# Patient Record
Sex: FEMALE | Race: WHITE | NOT HISPANIC OR LATINO | Employment: UNEMPLOYED | RURAL
[De-identification: names, ages, dates, MRNs, and addresses within clinical notes are randomized per-mention and may not be internally consistent; named-entity substitution may affect disease eponyms.]

---

## 2022-11-30 ENCOUNTER — OFFICE VISIT (OUTPATIENT)
Dept: OBSTETRICS AND GYNECOLOGY | Facility: CLINIC | Age: 32
End: 2022-11-30
Payer: OTHER GOVERNMENT

## 2022-11-30 VITALS
DIASTOLIC BLOOD PRESSURE: 66 MMHG | WEIGHT: 188 LBS | HEIGHT: 68 IN | HEART RATE: 76 BPM | BODY MASS INDEX: 28.49 KG/M2 | SYSTOLIC BLOOD PRESSURE: 97 MMHG | RESPIRATION RATE: 16 BRPM

## 2022-11-30 DIAGNOSIS — Z31.9 PATIENT DESIRES PREGNANCY: ICD-10-CM

## 2022-11-30 PROCEDURE — 99203 OFFICE O/P NEW LOW 30 MIN: CPT | Mod: S$PBB,,, | Performed by: STUDENT IN AN ORGANIZED HEALTH CARE EDUCATION/TRAINING PROGRAM

## 2022-11-30 PROCEDURE — 99203 OFFICE O/P NEW LOW 30 MIN: CPT | Mod: PBBFAC | Performed by: STUDENT IN AN ORGANIZED HEALTH CARE EDUCATION/TRAINING PROGRAM

## 2022-11-30 PROCEDURE — 99203 PR OFFICE/OUTPT VISIT, NEW, LEVL III, 30-44 MIN: ICD-10-PCS | Mod: S$PBB,,, | Performed by: STUDENT IN AN ORGANIZED HEALTH CARE EDUCATION/TRAINING PROGRAM

## 2022-11-30 RX ORDER — CLOMIPHENE CITRATE 50 MG/1
50 TABLET ORAL DAILY
Qty: 5 TABLET | Refills: 0 | Status: SHIPPED | OUTPATIENT
Start: 2022-11-30 | End: 2022-12-05

## 2022-11-30 NOTE — PROGRESS NOTES
Gynecology History and Physical    Assessment/Plan:   Problem List Items Addressed This Visit          Renal/    Patient desires pregnancy    Overview     Presents to discuss fertility  Has not been on contraception since getting  4 years ago  Began actively trying to conceive approx 2 years ago  Cycles 25-29 days  Has been tracking ovulation through test strips, apps  No chronic medical problems  Partner is 10 years older,   Planning to have SA done through   Will try one month of clomid 50mg  If unsuccessful, will refer to [facility]               CC:   Chief Complaint   Patient presents with    Discuss Fertility      Pt has been trying to get pregnant for 2 years and no luck would like to discuss options      HPI: Gregoria Zaldivar is a 32 y.o. female who presents with complaints of trying to conceive for 2 years.     Review of Systems: The following ROS was otherwise negative, except as noted in the HPI:  constitutional, HEENT, respiratory, cardiovascular, gastrointestinal, genitourinary, skin, musculoskeletal, neurological, psych    Gynecologic History:   No history of of STIs    Menstrual history: every 25-29 days.  Average is 27 day cycles.       Obstetrical History:  OB History          0    Para   0    Term   0       0    AB   0    Living   0         SAB   0    IAB   0    Ectopic   0    Multiple   0    Live Births   0                 Past Medical History:   History reviewed. No pertinent past medical history.    Medications:  Medication List with Changes/Refills   New Medications    CLOMIPHENE (CLOMID) 50 MG TABLET    Take 1 tablet (50 mg total) by mouth once daily. for 5 days   Current Medications    PRENAT.VITS,DEANN,MIN-IRON-FOLIC (PRENATAL VITAMIN) TAB    Prenatal Vitamins Oral Tablet QTY: 0 tablet Days: 0 Refills: 0  Written: 22 Patient Instructions:       Allergies:  Patient has no known allergies.    Surgical History:  History reviewed. No pertinent surgical  "history.    Family History:  Family History   Problem Relation Age of Onset    Lung cancer Paternal Grandfather     Diabetes Maternal Grandmother     Leukemia Maternal Grandfather     Hypertension Father        Social History:  Social History     Substance and Sexual Activity   Alcohol Use Yes    Comment: socially     Social History     Substance and Sexual Activity   Drug Use Never     Social History     Tobacco Use   Smoking Status Never   Smokeless Tobacco Never       Physical Exam:  BP 97/66 (BP Location: Left arm, Patient Position: Sitting)   Pulse 76   Resp 16   Ht 5' 8" (1.727 m)   Wt 85.3 kg (188 lb)   LMP 11/13/2022   BMI 28.59 kg/m²     General: Alert, well appearing, no acute distress  Head: Normocephalic, atraumatic  Lungs: Unlabored respirations  Extremities: No redness or tenderness  Skin: Well perfused, normal coloration and turgor, no lesions or rashes visualized  Neuro: Alert, oriented, normal speech, no focal deficits, moves extremities appropriately  Osteopathic: No TART changes    Labs:  No visits with results within 1 Day(s) from this visit.   Latest known visit with results is:   No results found for any previous visit.      Spouse is getting referral for semen analysis with the .  Discussed [facility] options.   Rx for Clomid sent to pharmacy.  "

## 2022-12-01 PROBLEM — Z31.9 PATIENT DESIRES PREGNANCY: Status: ACTIVE | Noted: 2022-12-01

## 2023-01-02 ENCOUNTER — OFFICE VISIT (OUTPATIENT)
Dept: FAMILY MEDICINE | Facility: CLINIC | Age: 33
End: 2023-01-02
Payer: OTHER GOVERNMENT

## 2023-01-02 VITALS
OXYGEN SATURATION: 99 % | SYSTOLIC BLOOD PRESSURE: 118 MMHG | BODY MASS INDEX: 28.49 KG/M2 | HEART RATE: 93 BPM | WEIGHT: 188 LBS | DIASTOLIC BLOOD PRESSURE: 78 MMHG | HEIGHT: 68 IN

## 2023-01-02 DIAGNOSIS — J10.1 INFLUENZA A: Primary | ICD-10-CM

## 2023-01-02 DIAGNOSIS — Z20.822 CONTACT WITH AND (SUSPECTED) EXPOSURE TO COVID-19: ICD-10-CM

## 2023-01-02 LAB
CTP QC/QA: YES
FLUAV AG NPH QL: POSITIVE
FLUBV AG NPH QL: NEGATIVE
SARS-COV-2 AG RESP QL IA.RAPID: NEGATIVE

## 2023-01-02 PROCEDURE — 99202 OFFICE O/P NEW SF 15 MIN: CPT | Mod: ,,, | Performed by: NURSE PRACTITIONER

## 2023-01-02 PROCEDURE — 99202 PR OFFICE/OUTPT VISIT, NEW, LEVL II, 15-29 MIN: ICD-10-PCS | Mod: ,,, | Performed by: NURSE PRACTITIONER

## 2023-01-02 PROCEDURE — 87428 POCT SARS-COV2 (COVID) WITH FLU ANTIGEN: ICD-10-PCS | Mod: QW,,, | Performed by: NURSE PRACTITIONER

## 2023-01-02 PROCEDURE — 87428 SARSCOV & INF VIR A&B AG IA: CPT | Mod: QW,,, | Performed by: NURSE PRACTITIONER

## 2023-01-02 RX ORDER — OSELTAMIVIR PHOSPHATE 75 MG/1
75 CAPSULE ORAL 2 TIMES DAILY
Qty: 10 CAPSULE | Refills: 0 | Status: SHIPPED | OUTPATIENT
Start: 2023-01-02 | End: 2023-01-07

## 2023-01-02 NOTE — LETTER
January 2, 2023    Gregoria Zaldivar  [address] [facility] [facility] [place] [address]             [facility]  Family Medicine  [address] [facility] [place] [address]  Phone: [phone]  Fax: [phone]   January 2, 2023     Patient: Gregoria Zaldivar   YOB: 1990   Date of Visit: 1/2/2023       To Whom it May Concern:    Gregoria Zaldivar was seen in my clinic on 1/2/2023. She may return to work on 01/05/2023 .    Please excuse her from any classes or work missed.    If you have any questions or concerns, please don't hesitate to call.    Sincerely,         KAUSHAL Powell

## 2023-01-02 NOTE — PROGRESS NOTES
[facility]    Chief Complaint      Chief Complaint   Patient presents with    Cough    Headache    Dizziness    Fatigue       History of Present Illness      Gregoria Zaldivar is a 32 y.o. female. She  has no past medical history on file., who presents today for URI type symptoms x1-2 days.    Past Medical History:  History reviewed. No pertinent past medical history.    Past Surgical History:   has no past surgical history on file.    Social History:  Social History     Tobacco Use    Smoking status: Never    Smokeless tobacco: Never   Substance Use Topics    Alcohol use: Yes     Comment: socially    Drug use: Never       I personally reviewed all past medical, surgical, and social.     Review of Systems   Constitutional:  Positive for fatigue. Negative for chills and fever.   HENT:  Negative for rhinorrhea, sneezing and sore throat.    Respiratory:  Positive for cough. Negative for shortness of breath.    Gastrointestinal:  Negative for diarrhea, nausea and vomiting.   Musculoskeletal:  Negative for myalgias.   Skin:  Negative for rash.   Neurological:  Positive for dizziness and headaches.      Medications:  Outpatient Encounter Medications as of 1/2/2023   Medication Sig Dispense Refill    oseltamivir (TAMIFLU) 75 MG capsule Take 1 capsule (75 mg total) by mouth 2 (two) times daily. for 5 days 10 capsule 0    prenat.vits,gloria,min-iron-folic (PRENATAL VITAMIN) Tab Prenatal Vitamins Oral Tablet QTY: 0 tablet Days: 0 Refills: 0  Written: 06/30/22 Patient Instructions:       No facility-administered encounter medications on file as of 1/2/2023.       Allergies:  Review of patient's allergies indicates:  No Known Allergies    Health Maintenance:    There is no immunization history on file for this patient.   Health Maintenance   Topic Date Due    Hepatitis C Screening  Never done    Lipid Panel  Never done    TETANUS VACCINE  Never done        Physical Exam      Vital Signs  Pulse: 93  SpO2: 99 %  BP:  "118/78  BP Location: Right arm  Patient Position: Sitting  Height and Weight  Height: 5' 8" (172.7 cm)  Weight: 85.3 kg (188 lb)  BSA (Calculated - sq m): 2.02 sq meters  BMI (Calculated): 28.6  Weight in (lb) to have BMI = 25: 164.1]    Physical Exam  Vitals and nursing note reviewed.   Constitutional:       General: She is not in acute distress.     Appearance: She is well-developed. She is ill-appearing.   HENT:      Head: Normocephalic.      Right Ear: Hearing normal.      Left Ear: Hearing normal.      Nose: Nose normal.   Eyes:      General: Lids are normal.      Conjunctiva/sclera: Conjunctivae normal.   Cardiovascular:      Rate and Rhythm: Normal rate.   Pulmonary:      Effort: Pulmonary effort is normal. No respiratory distress.   Musculoskeletal:         General: Normal range of motion.      Cervical back: Normal range of motion and neck supple.   Skin:     General: Skin is warm and dry.   Neurological:      Mental Status: She is alert and oriented to person, place, and time.      Gait: Gait is intact.   Psychiatric:         Behavior: Behavior is cooperative.        Laboratory:  CBC:      CMP:        Invalid input(s): CREATININ  LIPIDS:      TSH:      A1C:        Assessment/Plan     Gregoria Zaldivar is a 32 y.o.female with:     1. Contact with and (suspected) exposure to covid-19  - POCT SARS-COV2 (COVID) with Flu Antigen    2. Influenza A  - oseltamivir (TAMIFLU) 75 MG capsule; Take 1 capsule (75 mg total) by mouth 2 (two) times daily. for 5 days  Dispense: 10 capsule; Refill: 0       Total time spent face-to-face and non-face-to-face coordinating care for this encounter was: 15 minutes     Chronic conditions status updated as per HPI.  Other than changes above, cont current medications and maintain follow up with specialists.  Return to clinic prn if symptoms worsen or fail to improve.    Carol Israel, KAUSHAL  [facility]  "

## 2023-01-03 ENCOUNTER — PATIENT MESSAGE (OUTPATIENT)
Dept: OBSTETRICS AND GYNECOLOGY | Facility: CLINIC | Age: 33
End: 2023-01-03
Payer: OTHER GOVERNMENT

## 2023-01-04 RX ORDER — CLOMIPHENE CITRATE 50 MG/1
50 TABLET ORAL DAILY
Qty: 5 TABLET | Refills: 2 | Status: SHIPPED | OUTPATIENT
Start: 2023-01-04 | End: 2023-01-09

## 2023-04-18 ENCOUNTER — PATIENT MESSAGE (OUTPATIENT)
Dept: OBSTETRICS AND GYNECOLOGY | Facility: CLINIC | Age: 33
End: 2023-04-18
Payer: OTHER GOVERNMENT

## 2023-04-19 ENCOUNTER — TELEPHONE (OUTPATIENT)
Dept: OBSTETRICS AND GYNECOLOGY | Facility: CLINIC | Age: 33
End: 2023-04-19
Payer: OTHER GOVERNMENT

## 2023-04-21 ENCOUNTER — TELEPHONE (OUTPATIENT)
Dept: OBSTETRICS AND GYNECOLOGY | Facility: CLINIC | Age: 33
End: 2023-04-21
Payer: OTHER GOVERNMENT

## 2023-04-21 NOTE — TELEPHONE ENCOUNTER
4/21/2023 ---- Attempted to call Gregoria re:  semen analysis orders for  and instruction;  when call was answered and I asked to speak with Gregoria, the call was disconnected.

## 2023-05-04 ENCOUNTER — TELEPHONE (OUTPATIENT)
Dept: OBSTETRICS AND GYNECOLOGY | Facility: CLINIC | Age: 33
End: 2023-05-04
Payer: OTHER GOVERNMENT

## 2023-05-04 DIAGNOSIS — Z31.9 PATIENT DESIRES PREGNANCY: Primary | ICD-10-CM

## 2023-05-23 ENCOUNTER — PATIENT MESSAGE (OUTPATIENT)
Dept: FAMILY MEDICINE | Facility: CLINIC | Age: 33
End: 2023-05-23
Payer: OTHER GOVERNMENT

## 2023-06-26 ENCOUNTER — PATIENT MESSAGE (OUTPATIENT)
Dept: OBSTETRICS AND GYNECOLOGY | Facility: CLINIC | Age: 33
End: 2023-06-26
Payer: OTHER GOVERNMENT

## 2023-06-28 ENCOUNTER — TELEPHONE (OUTPATIENT)
Dept: OBSTETRICS AND GYNECOLOGY | Facility: CLINIC | Age: 33
End: 2023-06-28
Payer: OTHER GOVERNMENT

## 2023-08-29 ENCOUNTER — OFFICE VISIT (OUTPATIENT)
Dept: FAMILY MEDICINE | Facility: CLINIC | Age: 33
End: 2023-08-29
Payer: OTHER GOVERNMENT

## 2023-08-29 VITALS
RESPIRATION RATE: 18 BRPM | TEMPERATURE: 100 F | HEIGHT: 67 IN | SYSTOLIC BLOOD PRESSURE: 116 MMHG | BODY MASS INDEX: 30.08 KG/M2 | HEART RATE: 105 BPM | DIASTOLIC BLOOD PRESSURE: 76 MMHG | WEIGHT: 191.63 LBS | OXYGEN SATURATION: 97 %

## 2023-08-29 DIAGNOSIS — R05.9 COUGH, UNSPECIFIED TYPE: ICD-10-CM

## 2023-08-29 DIAGNOSIS — J02.9 SORE THROAT: Primary | ICD-10-CM

## 2023-08-29 DIAGNOSIS — J10.1 INFLUENZA B: ICD-10-CM

## 2023-08-29 LAB
CTP QC/QA: YES
CTP QC/QA: YES
FLUAV AG NPH QL: NEGATIVE
FLUBV AG NPH QL: POSITIVE
S PYO RRNA THROAT QL PROBE: NEGATIVE
SARS-COV-2 AG RESP QL IA.RAPID: POSITIVE

## 2023-08-29 PROCEDURE — 87428 SARSCOV & INF VIR A&B AG IA: CPT | Mod: QW,,, | Performed by: FAMILY MEDICINE

## 2023-08-29 PROCEDURE — 87880 STREP A ASSAY W/OPTIC: CPT | Mod: QW,,, | Performed by: FAMILY MEDICINE

## 2023-08-29 PROCEDURE — 99213 OFFICE O/P EST LOW 20 MIN: CPT | Mod: ,,, | Performed by: FAMILY MEDICINE

## 2023-08-29 PROCEDURE — 87428 POCT SARS-COV2 (COVID) WITH FLU ANTIGEN: ICD-10-PCS | Mod: QW,,, | Performed by: FAMILY MEDICINE

## 2023-08-29 PROCEDURE — 87880 POCT RAPID STREP A: ICD-10-PCS | Mod: QW,,, | Performed by: FAMILY MEDICINE

## 2023-08-29 PROCEDURE — 99213 PR OFFICE/OUTPT VISIT, EST, LEVL III, 20-29 MIN: ICD-10-PCS | Mod: ,,, | Performed by: FAMILY MEDICINE

## 2023-08-29 RX ORDER — NEOMYCIN SULFATE, POLYMYXIN B SULFATE AND DEXAMETHASONE 3.5; 10000; 1 MG/ML; [USP'U]/ML; MG/ML
SUSPENSION/ DROPS OPHTHALMIC
COMMUNITY
Start: 2023-07-19

## 2023-08-29 RX ORDER — OSELTAMIVIR PHOSPHATE 75 MG/1
75 CAPSULE ORAL 2 TIMES DAILY
Qty: 10 CAPSULE | Refills: 0 | Status: SHIPPED | OUTPATIENT
Start: 2023-08-29 | End: 2023-09-03

## 2023-08-29 NOTE — LETTER
August 29, 2023      [facility]  [address] [address]  Phone: [phone]  Fax: [phone]       Patient: Gregoria Zaldivar   YOB: 1990  Date of Visit: 08/29/2023    To Whom It May Concern:    Archie Zaldivar  was at [facility] on 08/29/2023. The patient may return to work/school on 09/04/2023 with no restrictions. If you have any questions or concerns, or if I can be of further assistance, please do not hesitate to contact me.    Sincerely,    Gwendolyn Longoria MA

## 2023-08-29 NOTE — PROGRESS NOTES
Subjective     Patient ID: Gregoria Zaldivar is a 33 y.o. female.    Chief Complaint: Sore Throat (Sore throat started one week ago.), Fever (Fever has been as high as 101.), Cough (Patient has dry cough for the past week. She states it is like when she has bronchitis.), and Health Maintenance (Care gaps not addressed, patient ill today.)    Cough and sore throat for 1 week.  Fever began 2 days ago no vomiting or diarrhea    Sore Throat   Associated symptoms include coughing.   Fever   Associated symptoms include coughing and a sore throat.   Cough  Associated symptoms include a fever and a sore throat.     Review of Systems   Constitutional:  Positive for fever.   HENT:  Positive for sore throat.    Respiratory:  Positive for cough.           Objective     Physical Exam  Constitutional:       Appearance: She is not toxic-appearing.   HENT:      Right Ear: Tympanic membrane normal.      Left Ear: Tympanic membrane normal.      Nose: Congestion and rhinorrhea present.      Left Sinus: Maxillary sinus tenderness present.      Mouth/Throat:      Pharynx: Posterior oropharyngeal erythema present.   Cardiovascular:      Rate and Rhythm: Normal rate and regular rhythm.   Pulmonary:      Effort: Pulmonary effort is normal.      Breath sounds: Normal breath sounds.   Neurological:      Mental Status: She is alert.            Assessment and Plan     1. Sore throat  -     POCT SARS-COV2 (COVID) with Flu Antigen  -     POCT rapid strep A    2. Cough, unspecified type  -     POCT SARS-COV2 (COVID) with Flu Antigen    3. Influenza B    Other orders  -     oseltamivir (TAMIFLU) 75 MG capsule; Take 1 capsule (75 mg total) by mouth 2 (two) times daily. for 5 days  Dispense: 10 capsule; Refill: 0        Call for worsening fever or congestion         No follow-ups on file.

## 2024-04-24 ENCOUNTER — OFFICE VISIT (OUTPATIENT)
Dept: OBSTETRICS AND GYNECOLOGY | Facility: CLINIC | Age: 34
End: 2024-04-24
Payer: OTHER GOVERNMENT

## 2024-04-24 VITALS
HEART RATE: 68 BPM | HEIGHT: 67 IN | BODY MASS INDEX: 28.88 KG/M2 | SYSTOLIC BLOOD PRESSURE: 108 MMHG | WEIGHT: 184 LBS | DIASTOLIC BLOOD PRESSURE: 60 MMHG | RESPIRATION RATE: 16 BRPM

## 2024-04-24 DIAGNOSIS — N89.8 VAGINAL ITCHING: ICD-10-CM

## 2024-04-24 DIAGNOSIS — L68.0 HIRSUTISM: ICD-10-CM

## 2024-04-24 DIAGNOSIS — Z31.69 INFERTILITY COUNSELING: Primary | ICD-10-CM

## 2024-04-24 DIAGNOSIS — R53.83 FATIGUE, UNSPECIFIED TYPE: ICD-10-CM

## 2024-04-24 DIAGNOSIS — Z31.9 PATIENT DESIRES PREGNANCY: ICD-10-CM

## 2024-04-24 PROCEDURE — 99213 OFFICE O/P EST LOW 20 MIN: CPT | Mod: PBBFAC | Performed by: STUDENT IN AN ORGANIZED HEALTH CARE EDUCATION/TRAINING PROGRAM

## 2024-04-24 PROCEDURE — 99214 OFFICE O/P EST MOD 30 MIN: CPT | Mod: S$PBB,,, | Performed by: STUDENT IN AN ORGANIZED HEALTH CARE EDUCATION/TRAINING PROGRAM

## 2024-04-24 NOTE — PROGRESS NOTES
"Return Gyn Office Visit    Assessment/Plan  Problem List Items Addressed This Visit          Renal/    Patient desires pregnancy    Relevant Orders    17-Hydroxyprogesterone    CBC Auto Differential    DHEA-Sulfate    Estrogens, fractionated    Follicle Stimulating Hormone    Hemoglobin A1C    Luteinizing Hormone    Prolactin    TESTOSTERONE, FREE (DIALYSIS) AND TOTAL, LC/MS/MS    TSH    US Transvaginal Non OB    Infertility counseling     Other Visit Diagnoses       Fatigue, unspecified type    -  Primary    Relevant Orders    17-Hydroxyprogesterone    CBC Auto Differential    DHEA-Sulfate    Estrogens, fractionated    Follicle Stimulating Hormone    Hemoglobin A1C    Luteinizing Hormone    Prolactin    TESTOSTERONE, FREE (DIALYSIS) AND TOTAL, LC/MS/MS    TSH    Hirsutism        Relevant Orders    17-Hydroxyprogesterone    CBC Auto Differential    DHEA-Sulfate    Estrogens, fractionated    Follicle Stimulating Hormone    Hemoglobin A1C    Luteinizing Hormone    Prolactin    TESTOSTERONE, FREE (DIALYSIS) AND TOTAL, LC/MS/MS    TSH    Vaginal itching                  CC:   Chief Complaint   Patient presents with    PCOS testing      HPI:  34 y.o. who presents to office for fertility follow-up.  She is wanting a second opinion.  She has been to [facility], and they recommended IVF.  States she does get chin & breast hair.  C/o severe fatigue at times during the day.  States periods use to be 7 days, but in the last 2 years, it has been 3 days. Also thinks she has a yeast infection.    Has been trying to conceive for over 5 years without success. Discussed seeing another [facility] office for second opinion.    Review of Systems - The following ROS was otherwise negative, except as noted in the HPI:  constitutional, respiratory, cardiovascular, gastrointestinal, genitourinary    Objective:  /60   Pulse 68   Resp 16   Ht 5' 7" (1.702 m)   Wt 83.5 kg (184 lb)   LMP 03/30/2024   BMI 28.82 kg/m²   General: " Alert, well appearing, no acute distress  Abdomen: Soft, nontender, nondistended   Extremities: No redness or tenderness, neg Hector's sign  Osteopathic: no TART changes     Recommended Dr. Doyle at [facility]  Labs, US ordered  Return to discuss  Rx for diflucan sent to pharmacy.  All questions answered    Madeline H. [place]  OBGYN  [facility]

## 2024-04-25 RX ORDER — FLUCONAZOLE 150 MG/1
150 TABLET ORAL DAILY
Qty: 1 TABLET | Refills: 0 | Status: SHIPPED | OUTPATIENT
Start: 2024-04-25 | End: 2024-04-26

## 2024-04-30 PROBLEM — Z31.69 INFERTILITY COUNSELING: Status: ACTIVE | Noted: 2024-04-30

## 2024-05-02 ENCOUNTER — TELEPHONE (OUTPATIENT)
Dept: OBSTETRICS AND GYNECOLOGY | Facility: CLINIC | Age: 34
End: 2024-05-02
Payer: OTHER GOVERNMENT

## 2024-05-02 NOTE — TELEPHONE ENCOUNTER
----- Message from Bernadette Silverman CMA sent at 5/2/2024  1:48 PM CDT -----  Regarding: FW: Reschedule US    ----- Message -----  From: Delores Rosenberg  Sent: 5/2/2024   1:28 PM CDT  To: [facility] Staff  Subject: Reschedule US                                    Pt is calling to reschedule US either on 5/15 or 5/16 in the afternoon. Call back # [phone]

## 2024-05-16 ENCOUNTER — HOSPITAL ENCOUNTER (OUTPATIENT)
Dept: RADIOLOGY | Facility: HOSPITAL | Age: 34
Discharge: HOME | End: 2024-05-16
Attending: STUDENT IN AN ORGANIZED HEALTH CARE EDUCATION/TRAINING PROGRAM
Payer: OTHER GOVERNMENT

## 2024-05-16 DIAGNOSIS — Z31.9 PATIENT DESIRES PREGNANCY: ICD-10-CM

## 2024-05-16 PROCEDURE — 76856 US EXAM PELVIC COMPLETE: CPT | Mod: 26,,, | Performed by: RADIOLOGY

## 2024-05-16 PROCEDURE — 76856 US EXAM PELVIC COMPLETE: CPT | Mod: TC

## 2024-09-04 ENCOUNTER — PATIENT OUTREACH (OUTPATIENT)
Facility: HOSPITAL | Age: 34
End: 2024-09-04
Payer: OTHER GOVERNMENT

## 2024-09-04 ENCOUNTER — OFFICE VISIT (OUTPATIENT)
Dept: FAMILY MEDICINE | Facility: CLINIC | Age: 34
End: 2024-09-04
Payer: OTHER GOVERNMENT

## 2024-09-04 VITALS
SYSTOLIC BLOOD PRESSURE: 112 MMHG | WEIGHT: 178 LBS | DIASTOLIC BLOOD PRESSURE: 75 MMHG | OXYGEN SATURATION: 100 % | HEART RATE: 77 BPM | BODY MASS INDEX: 26.98 KG/M2 | TEMPERATURE: 98 F | HEIGHT: 68 IN | RESPIRATION RATE: 20 BRPM

## 2024-09-04 DIAGNOSIS — Z30.09 FAMILY PLANNING: Primary | ICD-10-CM

## 2024-09-04 PROCEDURE — 99212 OFFICE O/P EST SF 10 MIN: CPT | Mod: ,,, | Performed by: NURSE PRACTITIONER

## 2024-09-04 NOTE — LETTER
AUTHORIZATION FOR RELEASE OF   CONFIDENTIAL INFORMATION    Dear Dr. Alcantara,    We are seeing Gregoria Zaldivar, date of birth 1990, in the clinic at [facility]. Shruti Horton FNP is the patient's PCP. Gregoria Zaldivar has an outstanding lab/procedure at the time we reviewed her chart. In order to help keep her health information updated, she has authorized us to request the following medical record(s):        (  )  MAMMOGRAM                                      (  )  COLONOSCOPY      ( x )  PAP SMEAR                                          (  )  OUTSIDE LAB RESULTS     (  )  DEXA SCAN                                          (  )  EYE EXAM            (  )  FOOT EXAM                                          (  )  ENTIRE RECORD     (  )  OUTSIDE IMMUNIZATIONS                 (  )  _______________         Please fax records to Jeffrey Robins LPN Care Coordinator at [phone].      If you have any questions, please contact Jeffrey at [phone].           Patient Name: Gregoria Zaldivar  : 1990  Patient Phone #: [phone]                Gregoria Zaldivar  MRN: [id number]  : 1990  Age: 34 y.o.  Sex: female         Patient/Legal Guardian Signature  This signature was collected at 2024           _______________________________   Printed Name/Relationship to Patient      Consent for Examination and Treatment: I hereby authorize the providers and employees of [facility] ([facility]) to provide medical treatment/services which includes, but is not limited to, performing and administering tests and diagnostic procedures that are deemed necessary, including, but not limited to, imaging examinations, blood tests and other laboratory procedures as may be required by the hospital, clinic, or may be ordered by my physician(s) or persons working under the general and/or special instructions of my physician(s).      I understand and agree that this consent covers all authorized  persons, including but not limited to physicians, residents, nurse practitioners, physicians' assistants, specialists, consultants, student nurses, and independently contracted physicians, who are called upon by the physician in charge, to carry out the diagnostic procedures and medical or surgical treatment.     I hereby authorize [facility] to retain or dispose of any specimens or tissue, should there be such remaining from any test or procedure.     I hereby authorize and give consent for [facility] providers and employees to take photographs, images or videotapes of such diagnostic, surgical or treatment procedures of Patient as may be required by [facility] or as may be ordered by a physician. I further acknowledge and agree that [facility] may use cameras or other devices for patient monitoring.     I am aware that the practice of medicine is not an exact science, and I acknowledge that no guarantees have been made to me as to the outcome of any tests, procedures or treatment.     Authorization for Release of Information: I understand that my insurance company and/or their agents may need information necessary to make determinations about payment/reimbursement. I hereby provide authorization to release to all insurance companies, their successors, assignees, other parties with whom they may have contracted, or others acting on their behalf, that are involved with payment for any hospital and/or clinic charges incurred by the patient, any information that they request and deem necessary for payment/reimbursement, and/or quality review.  I further authorize the release of my health information to physicians or other health care practitioners on staff who are involved in my health care now and in the future, and to other health care providers, entities, or institutions for the purpose of my continued care and treatment, including referrals.     REGISTRATION AUTHORIZATION  Form No. 52011 (Rev. 3/25/2024)    Page 1 of 3                        Medicare Patient's Certification and Authorization to Release Information and Payment Request:  I certify that the information given by me in applying for payment under Title XVIII of the Social Security Act is correct. I authorize any thompson of medical or other information about me to release to the Social SecurityAdministration, or its intermediaries or carriers, any information needed for this or a related Medicare claim. I request that payment of authorized benefits be made on my behalf.     Assignment of Insurance Benefits:   I hereby authorize any and all insurance companies, health plans, defined   benefit plans, health insurers or any entity that is or may be responsible for payment of my medical expenses to pay all hospital and medical benefits now due, and to become due and payable to me under any hospital benefits, sick benefits, injury benefits or any other benefit for services rendered to me, including Major Medical Benefits, direct to [facility] and all independently contracted physicians. I assign any and all rights that I may have against any and all insurance companies, health plans, defined benefit plans, health insurers or any entity that is or may be responsible for payment of my medical expenses, including, but not limited to any right to appeal a denial of a claim, any right to bring any action, lawsuit, administrative proceeding, or other cause of action on my behalf. I specifically assign my right to pursue litigation against any and all insurance companies, health plans, defined benefit plans, health insurers or any entity that is or may be responsible for payment of my medical expenses based upon a refusal to pay charges.            E. Valuables: It is understood and agreed that [facility] is not liable for the damage to or loss of any money, jewelry,   documents, dentures, eye glasses, hearing aids, prosthetics, or other property of value.     F. Computer Equipment: I understand  and agree that should I choose to use computer equipment owned by [facility] or if I choose to access the Internet via [facility]s network, I do so at my own risk. [facility] is not responsible for any damage to my computer equipment or to any damages of any type that might arise from my loss of equipment or data.     G. Acceptance of Financial Responsibility:  I agree that in consideration of the services and   supplies that have been   or will be furnished to the patient, I am hereby obligated to pay all charges made for or on the account of the patient according to the standard rates (in effect at the time the services and supplies are delivered) established by [facility], including its Patient Financial Assistance Policy to the extent it is applicable. I understand that I am responsible for all charges, or portions thereof, not covered by insurance or other sources. Patient refunds will be distributed only after balances at all [facility] facilities are paid.     H. Communication Authorization:  I hereby authorize [facility] and its representatives, along with any billing service   or  who may work on their behalf, to contact me on   my cell phone and/or home phone using pre- recorded messages, artificial voice messages, automatic telephone dialing devices or other computer assisted technology, or by electronic      mail, text messaging, or by any other form of electronic communication. This includes, but is not limited to, appointment reminders, yearly physical exam reminders, preventive care reminders, patient campaigns, welcome calls, and calls about account balances on my account or any account on which I am listed as a guarantor. I understand I have the right to opt out of these communications at any time.      Relationship  Between  Facility and  Provider:      I understand that some, but not all, providers furnishing services to the patient are not employees or agents of [facility]. The patient is under the  care and supervision of his/her attending physician, and it is the responsibility of the facility and its nursing staff to carry out the instructions of such physicians. It is the responsibility of the patient's physician/designee to obtain the patient's informed consent, when required, for medical or surgical treatment, special diagnostic or therapeutic procedures, or hospital services rendered for the patient under the special instructions of the physician/designee.           REGISTRATION AUTHORIZATION  Form No. 95459 (Rev. 3/25/2024)    Page 2 of 3                       Immunizations: [facility] shares immunization information with state sponsored health departments to help you and your doctor keep track of your immunization records. By signing, you consent to have this information shared with the health department in your state:                                [place] - LINKS ([place] Immunization Network for Kids Statewide)                                [place] - MIIX ([place] Immunization Information eXchange)                                [place] - ImmPRINT (Immunization Patient Registry with Integrated Technology)     TERM: This authorization is valid for this and subsequent care/treatment I receive at [facility] and will remain valid unless/until revoked in writing by me.     [facility]: As used in this document, [facility] means all [facility] owned and managed facilities, including, but not limited to, all health centers, surgery centers, clinics, urgent care centers, and hospitals.         [facility] complies with applicable Federal civil rights laws and does not discriminate on the basis of race, color, national origin, age, disability, or sex.  ATENCIÓN: si habla español, tiene a thompson disposición servicios gratuitos de asistencia lingüística. Chris al [phone].  CHÚ Ý: N?u b?n nói Ti?ng Vi?t, có các d?ch v? h? tr? ngôn ng? mi?n phí dành cho b?n. G?i s?  [phone].        REGISTRATION AUTHORIZATION  Form No. 61518 (Rev. 3/25/2024)   Page 3 of 3     Patient

## 2024-09-04 NOTE — PROGRESS NOTES
Population Health Chart Review & Patient Outreach Details    Updates Requested / Reviewed:  [x]  Care Team Updated  [x]  Care Everywhere Updated & Reviewed  [x]  [facility] & [facility] Reviewed  [x]   Reviewed    Health Maintenance Topics Addressed and Outreach Outcomes / Actions Taken:  Cervical Cancer Screening [x] EDWIN sent to Dr. Echo Alcantara.

## 2024-09-04 NOTE — PROGRESS NOTES
KAUSHAL Linares        PATIENT NAME: Gregoria Zaldivar  : 1990  DATE: 24  MRN: [id number]      Patient PCP Information       Provider PCP Type    Primary Doctor No General            Reason for Visit / Chief Complaint: Establish Care (Pt presents to the clinic for est care)       Update PCP  Update Chief Complaint         History of Present Illness / Problem Focused Workflow     Gregoria Zaldivar presents to the clinic with Establish Care (Pt presents to the clinic for est care)     HPI  Patient presents to clinic to est care. Patient prev followed by Echo Alcantara. Patient transferring care. Patient currently being followed by [facility].    Pharmacy [facility]   Medical / Social / Family History     Past Medical History:   Diagnosis Date    Infertility, female Sep 2023       History reviewed. No pertinent surgical history.    Social History  Ms.  reports that she has never smoked. She has never used smokeless tobacco. She reports current alcohol use of about 7.0 standard drinks of alcohol per week. She reports that she does not use drugs.    Family History  Ms.'s family history includes COPD in her father; Diabetes in her maternal grandmother; Hypertension in her father; Leukemia in her maternal grandfather; Lung cancer in her paternal grandfather.    Medications and Allergies     Medications  Outpatient Medications Marked as Taking for the 24 encounter (Office Visit) with Shruti Horton FNP   Medication Sig Dispense Refill    UNABLE TO FIND Take 1 packet by mouth Daily. medication name: Perelel  Conception support supplements  Core prenatal, omega DHA+EPA, CoQ10, Folate         Allergies  Review of patient's allergies indicates:  No Known Allergies    Physical Examination     Vitals:    24 0722   BP: 112/75   BP Location: Right arm   Patient Position: Sitting   BP Method: Medium (Automatic)   Pulse: 77   Resp: 20   Temp: 97.6 °F  "(36.4 °C)   TempSrc: Oral   SpO2: 100%   Weight: 80.7 kg (178 lb)   Height: 5' 8" (1.727 m)       Physical Exam  Vitals reviewed.   Constitutional:       Appearance: Normal appearance.   HENT:      Head: Normocephalic.      Right Ear: External ear normal.      Left Ear: External ear normal.      Nose: Nose normal.      Mouth/Throat:      Mouth: Mucous membranes are moist.   Eyes:      Extraocular Movements: Extraocular movements intact.   Cardiovascular:      Rate and Rhythm: Normal rate and regular rhythm.      Pulses: Normal pulses.      Heart sounds: Normal heart sounds.   Pulmonary:      Effort: Pulmonary effort is normal. No respiratory distress.      Breath sounds: Normal breath sounds. No stridor. No wheezing, rhonchi or rales.   Chest:      Chest wall: No tenderness.   Musculoskeletal:         General: Normal range of motion.      Cervical back: Normal range of motion.   Skin:     General: Skin is warm and dry.      Capillary Refill: Capillary refill takes less than 2 seconds.   Neurological:      General: No focal deficit present.      Mental Status: She is alert and oriented to person, place, and time.   Psychiatric:         Mood and Affect: Mood normal.         Behavior: Behavior normal.         Thought Content: Thought content normal.         Judgment: Judgment normal.           No visits with results within 14 Day(s) from this visit.   Latest known visit with results is:   Lab Requisition on 08/20/2024   Component Date Value Ref Range Status    Sodium 08/20/2024 141  136 - 145 mmol/L Final    Potassium 08/20/2024 4.6  3.5 - 5.1 mmol/L Final    Chloride 08/20/2024 109 (H)  98 - 107 mmol/L Final    CO2 08/20/2024 27  21 - 32 mmol/L Final    Anion Gap 08/20/2024 10  7 - 16 mmol/L Final    Glucose 08/20/2024 94  74 - 106 mg/dL Final    BUN 08/20/2024 16  7 - 18 mg/dL Final    Creatinine 08/20/2024 0.85  0.55 - 1.02 mg/dL Final    BUN/Creatinine Ratio 08/20/2024 19  6 - 20 Final    Calcium 08/20/2024 8.9  " 8.5 - 10.1 mg/dL Final    Total Protein 08/20/2024 6.8  6.4 - 8.2 g/dL Final    Albumin 08/20/2024 3.6  3.5 - 5.0 g/dL Final    Globulin 08/20/2024 3.2  2.0 - 4.0 g/dL Final    A/G Ratio 08/20/2024 1.1   Final    Bilirubin, Total 08/20/2024 0.5  >0.0 - 1.2 mg/dL Final    Alk Phos 08/20/2024 67  37 - 98 U/L Final    ALT 08/20/2024 22  13 - 56 U/L Final    AST 08/20/2024 20  15 - 37 U/L Final    eGFR 08/20/2024 91  >=60 mL/min/1.73m2 Final    Hemoglobin A1C 08/20/2024 4.7  4.5 - 6.6 % Final      Normal:               <5.7%  Pre-Diabetic:       5.7% to 6.4%  Diabetic:             >6.4%  Diabetic Goal:     <7%    Estimated Average Glucose 08/20/2024 88  mg/dL Final    Triglycerides 08/20/2024 155 (H)  35 - 150 mg/dL Final      Normal:  <150 mg/dL  Borderline High: 150-199 mg/dL  High:   200-499 mg/dL  Very High:  >=500    Cholesterol 08/20/2024 154  0 - 200 mg/dL Final      <200 mg/dL:  Desirable  200-240 mg/dL: Borderline High  >240 mg/dL:  High    HDL Cholesterol 08/20/2024 42  40 - 60 mg/dL Final      <40 mg/dL: Low HDL  40-60 mg/dL: Normal  >60 mg/dL: Desirable    Cholesterol/HDL Ratio (Risk Factor) 08/20/2024 3.7   Final    Non-HDL 08/20/2024 112  mg/dL Final    LDL Calculated 08/20/2024 81  mg/dL Final    Unable to calculate due to one of the following values:  Cholesterol <5  HDL Cholesterol <5  Triglycerides <10 or >400    LDL/HDL 08/20/2024 1.9   Final    Unable to calculate due to one of the following values:  Cholesterol <5  HDL Cholesterol <5  Triglycerides <10 or >400    VLDL 08/20/2024 31  mg/dL Final    TSH 08/20/2024 1.150  0.358 - 3.740 uIU/mL Final    Vitamin D 25-Hydroxy, Blood 08/20/2024 34.0  ng/mL Final    Vitamin D 25-OH Adult Reference Values:  Deficiency: <20 ng/mL  Insufficiency: 20 - <30 ng/mL  Sufficiency: 30 -100 ng/mL    Vitamin D 25-OH Pediatric Reference Values:  Deficiency: <15 ng/mL  Insufficiency: 15 - <20 ng/mL  Sufficiency: 20 - 100 ng/mL    WBC 08/20/2024 6.97  4.50 - 11.00 K/uL  Final    RBC 08/20/2024 4.25  4.20 - 5.40 M/uL Final    Hemoglobin 08/20/2024 13.2  12.0 - 16.0 g/dL Final    Hematocrit 08/20/2024 40.3  38.0 - 47.0 % Final    MCV 08/20/2024 94.8  80.0 - 96.0 fL Final    MCH 08/20/2024 31.1 (H)  27.0 - 31.0 pg Final    MCHC 08/20/2024 32.8  32.0 - 36.0 g/dL Final    RDW 08/20/2024 11.7  11.5 - 14.5 % Final    Platelet Count 08/20/2024 240  150 - 400 K/uL Final    MPV 08/20/2024 11.5  9.4 - 12.4 fL Final    Neutrophils % 08/20/2024 65.5 (H)  53.0 - 65.0 % Final    Lymphocytes % 08/20/2024 28.0  27.0 - 41.0 % Final    Monocytes % 08/20/2024 4.4  2.0 - 6.0 % Final    Eosinophils % 08/20/2024 1.4  1.0 - 4.0 % Final    Basophils % 08/20/2024 0.6  0.0 - 1.0 % Final    Immature Granulocytes % 08/20/2024 0.1  0.0 - 0.4 % Final    nRBC, Auto 08/20/2024 0.0  <=0.0 % Final    Neutrophils, Abs 08/20/2024 4.56  1.80 - 7.70 K/uL Final    Lymphocytes, Absolute 08/20/2024 1.95  1.00 - 4.80 K/uL Final    Monocytes, Absolute 08/20/2024 0.31  0.00 - 0.80 K/uL Final    Eosinophils, Absolute 08/20/2024 0.10  0.00 - 0.50 K/uL Final    Basophils, Absolute 08/20/2024 0.04  0.00 - 0.20 K/uL Final    Immature Granulocytes, Absolute 08/20/2024 0.01  0.00 - 0.04 K/uL Final    nRBC, Absolute 08/20/2024 0.00  <=0.00 x10e3/uL Final    Diff Type 08/20/2024 Auto   Final             Assessment and Plan (including Health Maintenance)      Problem List  Smart Sets  Document Outside HM   :    Plan:     1. Family planning    Patient to follow up with Dr Perez for IUI. Patient may need frequent lab at Dr Perez request during process. Will work with fertility specialist as needed.   RTC for wellness 4/2025  Records for last pap requested    There are no Patient Instructions on file for this visit.       Health Maintenance Due   Topic Date Due    Hepatitis C Screening  Never done    Cervical Cancer Screening  Never done    HIV Screening  Never done    TETANUS VACCINE  Never done    Influenza Vaccine (1) Never done     COVID-19 Vaccine (1 - 2023-24 season) Never done       Most Recent Immunizations   Administered Date(s) Administered    HPV Quadrivalent 03/15/2010            Health Maintenance Topics with due status: Not Due       Topic Last Completion Date    Hemoglobin A1c (Diabetic Prevention Screening) 08/20/2024       Future Appointments   Date Time Provider Department Center   4/15/2025  8:30 AM Shruti Horton FNP [facility]            Signature:  KAUSHAL Linares  [facility]     Date of encounter: 9/4/24

## 2024-10-11 ENCOUNTER — OFFICE VISIT (OUTPATIENT)
Dept: FAMILY MEDICINE | Facility: CLINIC | Age: 34
End: 2024-10-11
Payer: OTHER GOVERNMENT

## 2024-10-11 VITALS
DIASTOLIC BLOOD PRESSURE: 60 MMHG | TEMPERATURE: 98 F | SYSTOLIC BLOOD PRESSURE: 90 MMHG | HEART RATE: 51 BPM | BODY MASS INDEX: 27.22 KG/M2 | OXYGEN SATURATION: 100 % | WEIGHT: 179 LBS

## 2024-10-11 DIAGNOSIS — J32.9 SINUSITIS, UNSPECIFIED CHRONICITY, UNSPECIFIED LOCATION: Primary | ICD-10-CM

## 2024-10-11 DIAGNOSIS — Z20.828 EXPOSURE TO VIRAL DISEASE: ICD-10-CM

## 2024-10-11 LAB
CTP QC/QA: YES
CTP QC/QA: YES
MOLECULAR STREP A: NEGATIVE
SARS-COV-2 RDRP RESP QL NAA+PROBE: NEGATIVE

## 2024-10-11 PROCEDURE — 96372 THER/PROPH/DIAG INJ SC/IM: CPT | Mod: ,,, | Performed by: FAMILY MEDICINE

## 2024-10-11 PROCEDURE — 87651 STREP A DNA AMP PROBE: CPT | Mod: QW,,, | Performed by: FAMILY MEDICINE

## 2024-10-11 PROCEDURE — 87635 SARS-COV-2 COVID-19 AMP PRB: CPT | Mod: QW,,, | Performed by: FAMILY MEDICINE

## 2024-10-11 PROCEDURE — 99214 OFFICE O/P EST MOD 30 MIN: CPT | Mod: 25,,, | Performed by: FAMILY MEDICINE

## 2024-10-11 RX ORDER — CEFTRIAXONE 1 G/1
1 INJECTION, POWDER, FOR SOLUTION INTRAMUSCULAR; INTRAVENOUS
Status: COMPLETED | OUTPATIENT
Start: 2024-10-11 | End: 2024-10-11

## 2024-10-11 RX ORDER — PREDNISONE 20 MG/1
40 TABLET ORAL DAILY
Qty: 10 TABLET | Refills: 0 | Status: SHIPPED | OUTPATIENT
Start: 2024-10-11 | End: 2024-10-16

## 2024-10-11 RX ORDER — AMOXICILLIN AND CLAVULANATE POTASSIUM 875; 125 MG/1; MG/1
1 TABLET, FILM COATED ORAL 2 TIMES DAILY
Qty: 14 TABLET | Refills: 0 | Status: SHIPPED | OUTPATIENT
Start: 2024-10-11 | End: 2024-10-18

## 2024-10-11 RX ORDER — DEXAMETHASONE SODIUM PHOSPHATE 4 MG/ML
4 INJECTION, SOLUTION INTRA-ARTICULAR; INTRALESIONAL; INTRAMUSCULAR; INTRAVENOUS; SOFT TISSUE
Status: COMPLETED | OUTPATIENT
Start: 2024-10-11 | End: 2024-10-11

## 2024-10-11 RX ADMIN — DEXAMETHASONE SODIUM PHOSPHATE 4 MG: 4 INJECTION, SOLUTION INTRA-ARTICULAR; INTRALESIONAL; INTRAMUSCULAR; INTRAVENOUS; SOFT TISSUE at 02:10

## 2024-10-11 RX ADMIN — CEFTRIAXONE 1 G: 1 INJECTION, POWDER, FOR SOLUTION INTRAMUSCULAR; INTRAVENOUS at 02:10

## 2024-10-11 NOTE — PROGRESS NOTES
Subjective:       Patient ID: Gregoria Zaldivar is a 34 y.o. female.    Chief Complaint: Sore Throat, Nasal Congestion, and Cough (Symptoms started last night)    Sore Throat   Associated symptoms include congestion and coughing. Pertinent negatives include no abdominal pain, diarrhea, drooling, ear discharge, ear pain, headaches, neck pain, shortness of breath, stridor, trouble swallowing or vomiting.   Cough  Associated symptoms include postnasal drip, rhinorrhea and a sore throat. Pertinent negatives include no chest pain, chills, ear pain, fever, headaches, myalgias, rash, shortness of breath or wheezing. There is no history of environmental allergies.     Review of Systems   Constitutional:  Negative for activity change, appetite change, chills, diaphoresis, fatigue, fever and unexpected weight change.   HENT:  Positive for nasal congestion, postnasal drip, rhinorrhea, sinus pressure/congestion and sore throat. Negative for dental problem, drooling, ear discharge, ear pain, facial swelling, hearing loss, mouth sores, nosebleeds, sneezing, tinnitus, trouble swallowing, voice change and goiter.    Eyes:  Negative for photophobia, discharge, itching and visual disturbance.   Respiratory:  Positive for cough. Negative for apnea, choking, chest tightness, shortness of breath, wheezing and stridor.    Cardiovascular:  Negative for chest pain, palpitations, leg swelling and claudication.   Gastrointestinal:  Negative for abdominal distention, abdominal pain, anal bleeding, blood in stool, change in bowel habit, constipation, diarrhea, nausea and vomiting.   Endocrine: Negative for cold intolerance, heat intolerance, polydipsia, polyphagia and polyuria.   Genitourinary:  Negative for bladder incontinence, decreased urine volume, difficulty urinating, dysuria, enuresis, flank pain, frequency, hematuria, nocturia, pelvic pain and urgency.   Musculoskeletal:  Negative for arthralgias, back pain, gait problem, joint  swelling, leg pain, myalgias, neck pain, neck stiffness and joint deformity.   Integumentary:  Negative for pallor, rash, wound, breast mass and breast tenderness.   Allergic/Immunologic: Negative for environmental allergies, food allergies and immunocompromised state.   Neurological:  Negative for dizziness, vertigo, tremors, seizures, syncope, facial asymmetry, speech difficulty, weakness, light-headedness, numbness, headaches, memory loss and coordination difficulties.   Hematological:  Negative for adenopathy. Does not bruise/bleed easily.   Psychiatric/Behavioral:  Negative for agitation, behavioral problems, confusion, decreased concentration, dysphoric mood, hallucinations, self-injury, sleep disturbance and suicidal ideas. The patient is not nervous/anxious and is not hyperactive.    Breast: Negative for mass and tenderness        Objective:      Physical Exam  Vitals reviewed.   Constitutional:       Appearance: Normal appearance.   HENT:      Head: Normocephalic and atraumatic.      Right Ear: Tympanic membrane, ear canal and external ear normal.      Left Ear: Tympanic membrane, ear canal and external ear normal.      Nose: Congestion and rhinorrhea present.      Mouth/Throat:      Mouth: Mucous membranes are moist.      Pharynx: Oropharynx is clear. Posterior oropharyngeal erythema present.   Eyes:      Extraocular Movements: Extraocular movements intact.      Conjunctiva/sclera: Conjunctivae normal.      Pupils: Pupils are equal, round, and reactive to light.   Cardiovascular:      Rate and Rhythm: Normal rate and regular rhythm.      Pulses: Normal pulses.      Heart sounds: Normal heart sounds.   Pulmonary:      Effort: Pulmonary effort is normal.      Breath sounds: Normal breath sounds.   Abdominal:      General: Bowel sounds are normal.      Palpations: Abdomen is soft.   Musculoskeletal:         General: Normal range of motion.      Cervical back: Normal range of motion and neck supple.   Skin:      General: Skin is warm and dry.   Neurological:      General: No focal deficit present.      Mental Status: She is alert. Mental status is at baseline.   Psychiatric:         Mood and Affect: Mood normal.         Behavior: Behavior normal.         Thought Content: Thought content normal.         Judgment: Judgment normal.         Assessment:       1. Sinusitis, unspecified chronicity, unspecified location    2. Exposure to viral disease        Plan:     Sinusitis, unspecified chronicity, unspecified location  -     cefTRIAXone injection 1 g  -     dexAMETHasone injection 4 mg  -     amoxicillin-clavulanate 875-125mg (AUGMENTIN) 875-125 mg per tablet; Take 1 tablet by mouth 2 (two) times a day. for 7 days  Dispense: 14 tablet; Refill: 0  -     predniSONE (DELTASONE) 20 MG tablet; Take 2 tablets (40 mg total) by mouth once daily. for 5 days  Dispense: 10 tablet; Refill: 0    Exposure to viral disease  -     POCT COVID-19 Rapid Screening  -     POCT Strep A, Molecular

## 2024-10-17 ENCOUNTER — PATIENT MESSAGE (OUTPATIENT)
Dept: FAMILY MEDICINE | Facility: CLINIC | Age: 34
End: 2024-10-17
Payer: OTHER GOVERNMENT

## 2024-10-18 ENCOUNTER — OFFICE VISIT (OUTPATIENT)
Dept: FAMILY MEDICINE | Facility: CLINIC | Age: 34
End: 2024-10-18
Payer: OTHER GOVERNMENT

## 2024-10-18 VITALS
DIASTOLIC BLOOD PRESSURE: 70 MMHG | RESPIRATION RATE: 20 BRPM | SYSTOLIC BLOOD PRESSURE: 103 MMHG | HEIGHT: 68 IN | OXYGEN SATURATION: 91 % | HEART RATE: 91 BPM | TEMPERATURE: 98 F | WEIGHT: 176.31 LBS | BODY MASS INDEX: 26.72 KG/M2

## 2024-10-18 DIAGNOSIS — J06.9 VIRAL URI: ICD-10-CM

## 2024-10-18 DIAGNOSIS — R19.7 DIARRHEA, UNSPECIFIED TYPE: Primary | ICD-10-CM

## 2024-10-18 LAB
CTP QC/QA: YES
MOLECULAR STREP A: NEGATIVE
POC MOLECULAR INFLUENZA A AGN: NEGATIVE
POC MOLECULAR INFLUENZA B AGN: NEGATIVE
SARS-COV-2 RDRP RESP QL NAA+PROBE: NEGATIVE

## 2024-10-18 PROCEDURE — 87493 C DIFF AMPLIFIED PROBE: CPT | Mod: ,,, | Performed by: CLINICAL MEDICAL LABORATORY

## 2024-10-18 RX ORDER — BENZONATATE 200 MG/1
200 CAPSULE ORAL EVERY 8 HOURS PRN
COMMUNITY
Start: 2024-10-11 | End: 2024-10-21

## 2024-10-18 RX ORDER — LETROZOLE 2.5 MG/1
2.5 TABLET, FILM COATED ORAL
COMMUNITY
Start: 2024-09-12

## 2024-10-18 NOTE — PROGRESS NOTES
KAUSHAL Linares        PATIENT NAME: Gregoria Zaldivar  : 1990  DATE: 10/18/24  MRN: [id number]      Patient PCP Information       Provider PCP Type    KAUSHAL Linares General            Reason for Visit / Chief Complaint: Diarrhea (Pt stated she have insane diarrhea since she been taking meds that was giving to her over the wknd), Cough (Wet cough do to all the draiange), and Nasal Congestion (Runny nose and a lot of drainage)       Update PCP  Update Chief Complaint         History of Present Illness / Problem Focused Workflow     Gregoria Zaldivar presents to the clinic with Diarrhea (Pt stated she have insane diarrhea since she been taking meds that was giving to her over the wknd), Cough (Wet cough do to all the draiange), and Nasal Congestion (Runny nose and a lot of drainage)     HPI  Patient saw Dr Mansfield on 10/11/2024 prescribed rocephin, decadron, augmentin and prednisone for sinusitis. Patient now with continued cough, diarrhea since taking Augmentin, nasal congestion.   Patient send provider message yesterday regarding symptoms. Recommended follow up today. Instructed to stop Augmentin and take probiotic.     Medical / Social / Family History     Past Medical History:   Diagnosis Date    Infertility, female Sep 2023       History reviewed. No pertinent surgical history.    Social History  Ms.  reports that she has never smoked. She has never used smokeless tobacco. She reports current alcohol use of about 7.0 standard drinks of alcohol per week. She reports that she does not use drugs.    Family History  Ms.'s family history includes COPD in her father; Diabetes in her maternal grandmother; Hypertension in her father; Leukemia in her maternal grandfather; Lung cancer in her paternal grandfather.    Medications and Allergies     Medications  Outpatient Medications Marked as Taking for the 10/18/24 encounter (Office Visit) with Shruti Horton FNP   Medication Sig Dispense Refill     "benzonatate (TESSALON) 200 MG capsule Take 200 mg by mouth every 8 (eight) hours as needed.      letrozole (FEMARA) 2.5 mg Tab Take 2.5 mg by mouth.         Allergies  Review of patient's allergies indicates:  No Known Allergies    Physical Examination     Vitals:    10/18/24 0939   BP: 103/70   BP Location: Right arm   Patient Position: Sitting   Pulse: 91   Resp: 20   Temp: 97.6 °F (36.4 °C)   TempSrc: Oral   SpO2: (!) 91%   Weight: 80 kg (176 lb 4.8 oz)   Height: 5' 8" (1.727 m)       Physical Exam  Vitals reviewed.   Constitutional:       Appearance: Normal appearance.   HENT:      Head: Normocephalic.      Right Ear: External ear normal.      Left Ear: External ear normal.      Nose: Nose normal.      Mouth/Throat:      Mouth: Mucous membranes are moist.   Eyes:      Extraocular Movements: Extraocular movements intact.   Cardiovascular:      Rate and Rhythm: Normal rate.      Pulses: Normal pulses.      Heart sounds: Normal heart sounds.   Pulmonary:      Effort: Pulmonary effort is normal. No respiratory distress.      Breath sounds: Normal breath sounds. No stridor. No wheezing, rhonchi or rales.      Comments: Loose wet cough  Chest:      Chest wall: No tenderness.   Musculoskeletal:         General: Normal range of motion.      Cervical back: Normal range of motion.   Skin:     General: Skin is warm and dry.   Neurological:      General: No focal deficit present.      Mental Status: She is alert.   Psychiatric:         Mood and Affect: Mood normal.         Behavior: Behavior normal.         Thought Content: Thought content normal.         Judgment: Judgment normal.           Office Visit on 10/18/2024   Component Date Value Ref Range Status    POC Rapid COVID 10/18/2024 Negative  Negative Final     Acceptable 10/18/2024 Yes   Final    POC Molecular Influenza A Ag 10/18/2024 Negative  Negative Final    POC Molecular Influenza B Ag 10/18/2024 Negative  Negative Final     " Acceptable 10/18/2024 Yes   Final    Molecular Strep A, POC 10/18/2024 Negative  Negative Final     Acceptable 10/18/2024 Yes   Final   Office Visit on 10/11/2024   Component Date Value Ref Range Status    POC Rapid COVID 10/11/2024 Negative  Negative Final     Acceptable 10/11/2024 Yes   Final    Molecular Strep A, POC 10/11/2024 Negative  Negative Final     Acceptable 10/11/2024 Yes   Final             Assessment and Plan (including Health Maintenance)      Problem List  Smart Sets  Document Outside HM   :    Plan:     1. Diarrhea, unspecified type  -     POCT COVID-19 Rapid Screening  -     POCT Influenza A/B Molecular  -     POCT Strep A, Molecular  -     C Diff Toxin by PCR; Future; Expected date: 10/18/2024    2. Viral URI      Continue cold meds as needed   Immodium bid to slow diarrhea  Increase oral hydration and bland diet  Stool for cdiff  Augmentin dc prior to last dose  RTC prn   There are no Patient Instructions on file for this visit.       Health Maintenance Due   Topic Date Due    Hepatitis C Screening  Never done    Cervical Cancer Screening  Never done    HIV Screening  Never done    TETANUS VACCINE  Never done    Influenza Vaccine (1) Never done    COVID-19 Vaccine (1 - 2024-25 season) Never done       Most Recent Immunizations   Administered Date(s) Administered    HPV Quadrivalent 03/15/2010            Health Maintenance Topics with due status: Not Due       Topic Last Completion Date    Hemoglobin A1c (Diabetic Prevention Screening) 08/20/2024    RSV Vaccine (Age 60+ and Pregnant patients) Not Due       Future Appointments   Date Time Provider Department Center   10/18/2024 10:15 AM [facility]   4/15/2025  8:30 AM Shruti Horton FNP [facility]            Signature:  KAUSHAL Linares  [facility]     Date of encounter: 10/18/24

## 2024-11-11 ENCOUNTER — HOSPITAL ENCOUNTER (OUTPATIENT)
Dept: RADIOLOGY | Facility: HOSPITAL | Age: 34
Discharge: HOME | End: 2024-11-11
Attending: NURSE PRACTITIONER
Payer: OTHER GOVERNMENT

## 2024-11-11 ENCOUNTER — OFFICE VISIT (OUTPATIENT)
Dept: FAMILY MEDICINE | Facility: CLINIC | Age: 34
End: 2024-11-11
Payer: OTHER GOVERNMENT

## 2024-11-11 VITALS
SYSTOLIC BLOOD PRESSURE: 95 MMHG | HEART RATE: 71 BPM | OXYGEN SATURATION: 97 % | BODY MASS INDEX: 26.45 KG/M2 | HEIGHT: 68 IN | RESPIRATION RATE: 20 BRPM | WEIGHT: 174.5 LBS | DIASTOLIC BLOOD PRESSURE: 68 MMHG | TEMPERATURE: 99 F

## 2024-11-11 DIAGNOSIS — M77.12 LATERAL EPICONDYLITIS OF LEFT ELBOW: ICD-10-CM

## 2024-11-11 DIAGNOSIS — M25.522 LEFT ELBOW PAIN: Primary | ICD-10-CM

## 2024-11-11 DIAGNOSIS — M25.522 LEFT ELBOW PAIN: ICD-10-CM

## 2024-11-11 PROCEDURE — 73070 X-RAY EXAM OF ELBOW: CPT | Mod: TC,LT

## 2024-11-11 PROCEDURE — 96372 THER/PROPH/DIAG INJ SC/IM: CPT | Mod: ,,, | Performed by: NURSE PRACTITIONER

## 2024-11-11 PROCEDURE — 73070 X-RAY EXAM OF ELBOW: CPT | Mod: 26,LT,, | Performed by: RADIOLOGY

## 2024-11-11 PROCEDURE — 99213 OFFICE O/P EST LOW 20 MIN: CPT | Mod: 25,,, | Performed by: NURSE PRACTITIONER

## 2024-11-11 RX ORDER — KETOROLAC TROMETHAMINE 30 MG/ML
30 INJECTION, SOLUTION INTRAMUSCULAR; INTRAVENOUS ONCE
Status: COMPLETED | OUTPATIENT
Start: 2024-11-11 | End: 2024-11-11

## 2024-11-11 RX ORDER — IBUPROFEN 800 MG/1
800 TABLET ORAL EVERY 8 HOURS PRN
Qty: 30 TABLET | Refills: 0 | Status: SHIPPED | OUTPATIENT
Start: 2024-11-11

## 2024-11-11 RX ORDER — DEXAMETHASONE SODIUM PHOSPHATE 4 MG/ML
4 INJECTION, SOLUTION INTRA-ARTICULAR; INTRALESIONAL; INTRAMUSCULAR; INTRAVENOUS; SOFT TISSUE
Status: COMPLETED | OUTPATIENT
Start: 2024-11-11 | End: 2024-11-11

## 2024-11-11 RX ADMIN — DEXAMETHASONE SODIUM PHOSPHATE 4 MG: 4 INJECTION, SOLUTION INTRA-ARTICULAR; INTRALESIONAL; INTRAMUSCULAR; INTRAVENOUS; SOFT TISSUE at 10:11

## 2024-11-11 RX ADMIN — KETOROLAC TROMETHAMINE 30 MG: 30 INJECTION, SOLUTION INTRAMUSCULAR; INTRAVENOUS at 10:11

## 2024-11-11 NOTE — PROGRESS NOTES
KAUSHAL Linares        PATIENT NAME: Gregoria Zaldivar  : 1990  DATE: 24  MRN: [id number]      Patient PCP Information       Provider PCP Type    KAUSHAL Linares [facility]            Reason for Visit / Chief Complaint: Hand Pain and Elbow Pain (With picking up items a sharp pain goes through hand into elbow area. X 3 weeks L arm)       Update PCP  Update Chief Complaint         History of Present Illness / Problem Focused Workflow     Gregoria Zaldivar presents to the clinic with Hand Pain and Elbow Pain (With picking up items a sharp pain goes through hand into elbow area. X 3 weeks L arm)     Hand Pain     Elbow Pain      Left elbow pain for last 3 weeks. Difficulty picking up objects wo pain. Shooting pain from hand to elbow at times as well. Left elbow lateral epicondyle location.   Patient has taken OTC NSAID at home some relief.     Medical / Social / Family History     Past Medical History:   Diagnosis Date    Infertility, female Sep 2023       History reviewed. No pertinent surgical history.    Social History  Ms.  reports that she has never smoked. She has never been exposed to tobacco smoke. She has never used smokeless tobacco. She reports current alcohol use of about 7.0 standard drinks of alcohol per week. She reports that she does not use drugs.    Family History  Ms.'s family history includes COPD in her father; Diabetes in her maternal grandmother; Hypertension in her father; Leukemia in her maternal grandfather; Lung cancer in her paternal grandfather.    Medications and Allergies     Medications  Outpatient Medications Marked as Taking for the 24 encounter (Office Visit) with Shruti Horton FNP   Medication Sig Dispense Refill    letrozole (FEMARA) 2.5 mg Tab Take 2.5 mg by mouth every 30 days.      UNABLE TO FIND Take 1 packet by mouth Daily. medication name: Kel  Conception support supplements  Core prenatal, omega DHA+EPA, CoQ10, Folate       Current  "Facility-Administered Medications for the 11/11/24 encounter (Office Visit) with Shruti Horton FNP   Medication Dose Route Frequency Provider Last Rate Last Admin    dexAMETHasone injection 4 mg  4 mg Intramuscular 1 time in Clinic/HOD Shruti Horton FNP        ketorolac injection 30 mg  30 mg Intramuscular Once Shruti Horton FNP           Allergies  Review of patient's allergies indicates:  No Known Allergies    Physical Examination     Vitals:    11/11/24 1005   BP: 95/68   BP Location: Right arm   Patient Position: Sitting   Pulse: 71   Resp: 20   Temp: 98.5 °F (36.9 °C)   TempSrc: Oral   SpO2: 97%   Weight: 79.2 kg (174 lb 8 oz)   Height: 5' 8" (1.727 m)       Physical Exam  Vitals and nursing note reviewed.   Constitutional:       Appearance: Normal appearance. She is normal weight.   HENT:      Head: Normocephalic.      Right Ear: External ear normal.      Left Ear: External ear normal.      Nose: Nose normal.      Mouth/Throat:      Mouth: Mucous membranes are moist.   Eyes:      Extraocular Movements: Extraocular movements intact.   Cardiovascular:      Rate and Rhythm: Normal rate.      Pulses: Normal pulses.   Pulmonary:      Effort: Pulmonary effort is normal.   Musculoskeletal:         General: No swelling or tenderness. Normal range of motion.      Cervical back: Normal range of motion and neck supple.      Right lower leg: No edema.      Left lower leg: No edema.      Comments: Negative phalens test  No significant redness or swelling LUE noted   Skin:     General: Skin is warm and dry.   Neurological:      General: No focal deficit present.      Mental Status: She is alert and oriented to person, place, and time. Mental status is at baseline.      Cranial Nerves: No cranial nerve deficit.      Sensory: No sensory deficit.      Motor: No weakness.   Psychiatric:         Mood and Affect: Mood normal.         Behavior: Behavior normal.         Thought Content: Thought content normal.        "  Judgment: Judgment normal.           No visits with results within 14 Day(s) from this visit.   Latest known visit with results is:   Office Visit on 10/18/2024   Component Date Value Ref Range Status    POC Rapid COVID 10/18/2024 Negative  Negative Final     Acceptable 10/18/2024 Yes   Final    POC Molecular Influenza A Ag 10/18/2024 Negative  Negative Final    POC Molecular Influenza B Ag 10/18/2024 Negative  Negative Final     Acceptable 10/18/2024 Yes   Final    Molecular Strep A, POC 10/18/2024 Negative  Negative Final     Acceptable 10/18/2024 Yes   Final    Clostridium difficile Toxin 10/18/2024 Negative  Negative, Invalid Final             Assessment and Plan (including Health Maintenance)      Problem List  Smart Sets  Document Outside HM   :    Plan:     1. Left elbow pain  -     X-Ray Elbow 2 Views Left; Future; Expected date: 11/11/2024    2. Lateral epicondylitis of left elbow  Comments:  motrin prn   IM decadron and toradol  XR left elbow ordered  RTC if not improving  OTC tennis elbow brace recommended  tennis elbow exercise included AVS  Orders:  -     dexAMETHasone injection 4 mg  -     ketorolac injection 30 mg  -     ibuprofen (ADVIL,MOTRIN) 800 MG tablet; Take 1 tablet (800 mg total) by mouth every 8 (eight) hours as needed for Pain.  Dispense: 30 tablet; Refill: 0    RTC prn     There are no Patient Instructions on file for this visit.       Health Maintenance Due   Topic Date Due    Hepatitis C Screening  Never done    Cervical Cancer Screening  Never done    HIV Screening  Never done    TETANUS VACCINE  Never done    Influenza Vaccine (1) Never done    COVID-19 Vaccine (1 - 2024-25 season) Never done       Most Recent Immunizations   Administered Date(s) Administered    HPV Quadrivalent 03/15/2010            Health Maintenance Topics with due status: Not Due       Topic Last Completion Date    Hemoglobin A1c (Diabetic Prevention Screening)  08/20/2024    RSV Vaccine (Age 60+ and Pregnant patients) Not Due       Future Appointments   Date Time Provider Department Center   4/15/2025  8:30 AM Shruti Horton FNP [facility] D[facility]mar[facility]            Signature:  KAUSHAL Linares  [facility]     Date of encounter: 11/11/24

## 2024-12-09 ENCOUNTER — PATIENT MESSAGE (OUTPATIENT)
Dept: FAMILY MEDICINE | Facility: CLINIC | Age: 34
End: 2024-12-09
Payer: OTHER GOVERNMENT

## 2024-12-10 DIAGNOSIS — M25.522 LEFT ELBOW PAIN: Primary | ICD-10-CM

## 2024-12-12 ENCOUNTER — TELEPHONE (OUTPATIENT)
Dept: ORTHOPEDICS | Facility: CLINIC | Age: 34
End: 2024-12-12
Payer: OTHER GOVERNMENT

## 2025-01-13 ENCOUNTER — OFFICE VISIT (OUTPATIENT)
Dept: ORTHOPEDICS | Facility: CLINIC | Age: 35
End: 2025-01-13
Payer: OTHER GOVERNMENT

## 2025-01-13 VITALS
DIASTOLIC BLOOD PRESSURE: 69 MMHG | BODY MASS INDEX: 27.19 KG/M2 | HEART RATE: 80 BPM | HEIGHT: 68 IN | WEIGHT: 179.38 LBS | SYSTOLIC BLOOD PRESSURE: 101 MMHG | OXYGEN SATURATION: 100 %

## 2025-01-13 DIAGNOSIS — M77.12 LATERAL EPICONDYLITIS OF LEFT ELBOW: Primary | ICD-10-CM

## 2025-01-13 DIAGNOSIS — M25.522 LEFT ELBOW PAIN: ICD-10-CM

## 2025-01-13 PROCEDURE — 99213 OFFICE O/P EST LOW 20 MIN: CPT | Mod: PBBFAC | Performed by: ORTHOPAEDIC SURGERY

## 2025-01-13 PROCEDURE — 20605 DRAIN/INJ JOINT/BURSA W/O US: CPT | Mod: PBBFAC,LT | Performed by: ORTHOPAEDIC SURGERY

## 2025-01-13 PROCEDURE — 99203 OFFICE O/P NEW LOW 30 MIN: CPT | Mod: S$PBB,25,, | Performed by: ORTHOPAEDIC SURGERY

## 2025-01-13 PROCEDURE — 99999PBSHW PR PBB SHADOW TECHNICAL ONLY FILED TO HB: Mod: PBBFAC,,,

## 2025-01-13 PROCEDURE — 99999 PR PBB SHADOW E&M-EST. PATIENT-LVL III: CPT | Mod: PBBFAC,,, | Performed by: ORTHOPAEDIC SURGERY

## 2025-01-13 RX ORDER — TRIAMCINOLONE ACETONIDE 40 MG/ML
40 INJECTION, SUSPENSION INTRA-ARTICULAR; INTRAMUSCULAR
Status: DISCONTINUED | OUTPATIENT
Start: 2025-01-13 | End: 2025-01-13 | Stop reason: HOSPADM

## 2025-01-13 RX ORDER — BUPIVACAINE HYDROCHLORIDE 2.5 MG/ML
25 INJECTION, SOLUTION EPIDURAL; INFILTRATION; INTRACAUDAL
Status: DISCONTINUED | OUTPATIENT
Start: 2025-01-13 | End: 2025-01-13 | Stop reason: HOSPADM

## 2025-01-13 RX ADMIN — TRIAMCINOLONE ACETONIDE 40 MG: 40 INJECTION, SUSPENSION INTRA-ARTICULAR; INTRAMUSCULAR at 02:01

## 2025-01-13 RX ADMIN — BUPIVACAINE HYDROCHLORIDE 25 MG: 2.5 INJECTION, SOLUTION EPIDURAL; INFILTRATION; INTRACAUDAL at 02:01

## 2025-01-13 NOTE — PROGRESS NOTES
Clinic Note        CC:   Chief Complaint   Patient presents with    Left Elbow - Pain        Principal problem: Lateral epicondylitis of left elbow [M77.12]     REASON FOR VISIT:       HISTORY:  34-year-old female who is complaining of left elbow pain especially when she is trying to do her workouts.  She denies any numbness or tingling.  She has worn a tennis elbow strap done some home therapy it is not helping      PAST MEDICAL HISTORY:   Past Medical History:   Diagnosis Date    Infertility, female Sep 2023          PAST SURGICAL HISTORY: History reviewed. No pertinent surgical history.       ALLERGIES: Review of patient's allergies indicates:  No Known Allergies     MEDICATIONS :    Current Outpatient Medications:     ibuprofen (ADVIL,MOTRIN) 800 MG tablet, Take 1 tablet (800 mg total) by mouth every 8 (eight) hours as needed for Pain., Disp: 30 tablet, Rfl: 0    UNABLE TO FIND, Take 1 packet by mouth Daily. medication name: Roel Conception support supplements Core prenatal, omega DHA+EPA, CoQ10, Folate, Disp: , Rfl:     letrozole (FEMARA) 2.5 mg Tab, Take 2.5 mg by mouth every 30 days. (Patient not taking: Reported on 1/13/2025.), Disp: , Rfl:      SOCIAL HISTORY:   Social History     Socioeconomic History    Marital status:    Tobacco Use    Smoking status: Never     Passive exposure: Never    Smokeless tobacco: Never   Substance and Sexual Activity    Alcohol use: Yes     Alcohol/week: 7.0 standard drinks of alcohol     Types: 2 Glasses of wine, 3 Cans of beer, 2 Drinks containing 0.5 oz of alcohol per week     Comment: socially    Drug use: Never    Sexual activity: Yes     Partners: Male     Birth control/protection: None   Social History Narrative    Lives at home with     Two dogs in home    No smoking in home     Social Drivers of Health     Financial Resource Strain: Low Risk  (8/28/2024)    Overall Financial Resource Strain (CARDIA)     Difficulty of Paying Living Expenses:  Not hard at all   Food Insecurity: No Food Insecurity (8/28/2024)    Hunger Vital Sign     Worried About Running Out of Food in the Last Year: Never true     Ran Out of Food in the Last Year: Never true   Physical Activity: Insufficiently Active (8/28/2024)    Exercise Vital Sign     Days of Exercise per Week: 4 days     Minutes of Exercise per Session: 30 min   Stress: No Stress Concern Present (8/28/2024)    [employer] [employer] - Occupational Stress Questionnaire     Feeling of Stress : Only a little   Housing Stability: Unknown (8/28/2024)    Housing Stability Vital Sign     Unable to Pay for Housing in the Last Year: No          FAMILY HISTORY:   Family History   Problem Relation Name Age of Onset    COPD Father Ash Young     Hypertension Father Ash Young     Diabetes Maternal Grandmother Heidi Luciano     Leukemia Maternal Grandfather      Lung cancer Paternal Grandfather            PHYSICAL EXAM:  In general, this is a well-developed, well-nourished female . The patient is alert, oriented and cooperative.      HEENT:  Normocephalic, atraumatic.  Extraocular movements are intact bilaterally.  The oropharynx is benign.       NECK:  Nontender with good range of motion.      LUNGS:  Clear to auscultation bilaterally.      HEART:  Demonstrates a regular rate and rhythm.  No murmurs appreciated.      ABDOMEN:  Soft, non-tender, non-distended.        EXTREMITIES:  Left upper extremity she moves her fingers has sensation to touch has palpable pulses tender palpation over her lateral epicondyle.  Pain on resisted extension of the wrist.  Has full motion of the elbow full pronation supination of the forearm      RADIOGRAPHIC FINDINGS:  X-rays show no fracture subluxation left elbow from 11/11/2024      IMPRESSION: Lateral epicondylitis of left elbow  -     Intermediate Joint Aspiration/Injection: L elbow    Left elbow pain  -     Ambulatory referral/consult to Orthopedics          PLAN:  At this time patient has tennis elbow on left.  We discussed treatment options.  I injected her elbow with 1 cc of Marcaine 1 cc Kenalog.  She is going to continue with a home exercises.  I will check her back in 2 months.  There are no Patient Instructions on file for this visit.      No follow-ups on file.         Franck Coulter      (Subject to voice recognition error, transcription service not allowed)

## 2025-01-13 NOTE — PROCEDURES
Intermediate Joint Aspiration/Injection: L elbow    Date/Time: 1/13/2025 2:00 PM    Performed by: Franck Coulter MD  Authorized by: Franck Coulter MD    Consent Done?:  Yes (Verbal)  Indications:  Pain    Location:  Elbow  Site:  L elbow  Ultrasonic Guidance for needle placement: No  Needle size:  25 G  Approach:  Posterolateral  Medications:  25 mg BUPivacaine (PF) 0.25% (2.5 mg/ml) 0.25 % (2.5 mg/mL); 40 mg triamcinolone acetonide 40 mg/mL  Patient tolerance:  Patient tolerated the procedure well with no immediate complications

## 2025-04-14 ENCOUNTER — OFFICE VISIT (OUTPATIENT)
Dept: FAMILY MEDICINE | Facility: CLINIC | Age: 35
End: 2025-04-14
Payer: OTHER GOVERNMENT

## 2025-04-14 VITALS
SYSTOLIC BLOOD PRESSURE: 111 MMHG | WEIGHT: 177 LBS | DIASTOLIC BLOOD PRESSURE: 79 MMHG | RESPIRATION RATE: 20 BRPM | BODY MASS INDEX: 26.83 KG/M2 | TEMPERATURE: 98 F | HEIGHT: 68 IN | HEART RATE: 78 BPM | OXYGEN SATURATION: 97 %

## 2025-04-14 DIAGNOSIS — E55.9 VITAMIN D DEFICIENCY: ICD-10-CM

## 2025-04-14 DIAGNOSIS — Z80.3 FAMILY HISTORY OF BREAST CANCER: ICD-10-CM

## 2025-04-14 DIAGNOSIS — Z00.00 WELL ADULT EXAM: Primary | ICD-10-CM

## 2025-04-14 DIAGNOSIS — Z23 IMMUNIZATION DUE: ICD-10-CM

## 2025-04-14 DIAGNOSIS — Z13.29 SCREENING FOR ENDOCRINE DISORDER: ICD-10-CM

## 2025-04-14 DIAGNOSIS — Z12.4 SCREENING FOR CERVICAL CANCER: ICD-10-CM

## 2025-04-14 DIAGNOSIS — Z11.59 NEED FOR HEPATITIS C SCREENING TEST: ICD-10-CM

## 2025-04-14 DIAGNOSIS — Z13.1 SCREENING FOR DIABETES MELLITUS: ICD-10-CM

## 2025-04-14 DIAGNOSIS — Z11.4 ENCOUNTER FOR SCREENING FOR HIV: ICD-10-CM

## 2025-04-14 DIAGNOSIS — E53.8 VITAMIN B12 DEFICIENCY: ICD-10-CM

## 2025-04-14 DIAGNOSIS — Z13.220 SCREENING FOR HYPERLIPIDEMIA: ICD-10-CM

## 2025-04-14 DIAGNOSIS — Z13.6 ENCOUNTER FOR SCREENING FOR CARDIOVASCULAR DISORDERS: ICD-10-CM

## 2025-04-14 LAB
25(OH)D3 SERPL-MCNC: 40.1 NG/ML (ref 30–80)
ALBUMIN SERPL BCP-MCNC: 4.2 G/DL (ref 3.5–5)
ALBUMIN/GLOB SERPL: 1.3 {RATIO}
ALP SERPL-CCNC: 62 U/L (ref 40–150)
ALT SERPL W P-5'-P-CCNC: 15 U/L
ANION GAP SERPL CALCULATED.3IONS-SCNC: 10 MMOL/L (ref 7–16)
AST SERPL W P-5'-P-CCNC: 21 U/L (ref 11–45)
BASOPHILS # BLD AUTO: 0.02 K/UL (ref 0–0.2)
BASOPHILS NFR BLD AUTO: 0.3 % (ref 0–1)
BILIRUB SERPL-MCNC: 0.7 MG/DL
BUN SERPL-MCNC: 11 MG/DL (ref 7–19)
BUN/CREAT SERPL: 14 (ref 6–20)
CALCIUM SERPL-MCNC: 9.4 MG/DL (ref 8.4–10.2)
CHLORIDE SERPL-SCNC: 103 MMOL/L (ref 98–107)
CHOLEST SERPL-MCNC: 203 MG/DL
CHOLEST/HDLC SERPL: 4.1 {RATIO}
CO2 SERPL-SCNC: 28 MMOL/L (ref 22–29)
CREAT SERPL-MCNC: 0.78 MG/DL (ref 0.55–1.02)
DIFFERENTIAL METHOD BLD: ABNORMAL
EGFR (NO RACE VARIABLE) (RUSH/TITUS): 100 ML/MIN/1.73M2
EOSINOPHIL # BLD AUTO: 0.11 K/UL (ref 0–0.5)
EOSINOPHIL NFR BLD AUTO: 1.8 % (ref 1–4)
ERYTHROCYTE [DISTWIDTH] IN BLOOD BY AUTOMATED COUNT: 11.7 % (ref 11.5–14.5)
EST. AVERAGE GLUCOSE BLD GHB EST-MCNC: 88 MG/DL
FOLATE SERPL-MCNC: 11.3 NG/ML (ref 7–31.4)
GLOBULIN SER-MCNC: 3.3 G/DL (ref 2–4)
GLUCOSE SERPL-MCNC: 82 MG/DL (ref 74–100)
HBA1C MFR BLD HPLC: 4.7 %
HCT VFR BLD AUTO: 42.7 % (ref 38–47)
HCV AB SER QL: NORMAL
HDLC SERPL-MCNC: 50 MG/DL (ref 35–60)
HGB BLD-MCNC: 14.2 G/DL (ref 12–16)
HIV 1+O+2 AB SERPL QL: NORMAL
IMM GRANULOCYTES # BLD AUTO: 0.02 K/UL (ref 0–0.04)
IMM GRANULOCYTES NFR BLD: 0.3 % (ref 0–0.4)
LDLC SERPL CALC-MCNC: 111 MG/DL
LDLC/HDLC SERPL: 2.2 {RATIO}
LYMPHOCYTES # BLD AUTO: 1.78 K/UL (ref 1–4.8)
LYMPHOCYTES NFR BLD AUTO: 28.9 % (ref 27–41)
MCH RBC QN AUTO: 31.5 PG (ref 27–31)
MCHC RBC AUTO-ENTMCNC: 33.3 G/DL (ref 32–36)
MCV RBC AUTO: 94.7 FL (ref 80–96)
MONOCYTES # BLD AUTO: 0.34 K/UL (ref 0–0.8)
MONOCYTES NFR BLD AUTO: 5.5 % (ref 2–6)
MPC BLD CALC-MCNC: 10.9 FL (ref 9.4–12.4)
NEUTROPHILS # BLD AUTO: 3.88 K/UL (ref 1.8–7.7)
NEUTROPHILS NFR BLD AUTO: 63.2 % (ref 53–65)
NONHDLC SERPL-MCNC: 153 MG/DL
NRBC # BLD AUTO: 0 X10E3/UL
NRBC, AUTO (.00): 0 %
PLATELET # BLD AUTO: 247 K/UL (ref 150–400)
POTASSIUM SERPL-SCNC: 4.2 MMOL/L (ref 3.5–5.1)
PROT SERPL-MCNC: 7.5 G/DL (ref 6.4–8.3)
RBC # BLD AUTO: 4.51 M/UL (ref 4.2–5.4)
SODIUM SERPL-SCNC: 137 MMOL/L (ref 136–145)
T4 FREE SERPL-MCNC: 0.91 NG/DL (ref 0.7–1.48)
TRIGL SERPL-MCNC: 212 MG/DL (ref 37–140)
TSH SERPL DL<=0.005 MIU/L-ACNC: 1.26 UIU/ML (ref 0.35–4.94)
VIT B12 SERPL-MCNC: 522 PG/ML (ref 213–816)
VLDLC SERPL-MCNC: 42 MG/DL
WBC # BLD AUTO: 6.15 K/UL (ref 4.5–11)

## 2025-04-14 PROCEDURE — 99395 PREV VISIT EST AGE 18-39: CPT | Mod: 25,,,

## 2025-04-14 PROCEDURE — 80061 LIPID PANEL: CPT | Mod: ,,, | Performed by: CLINICAL MEDICAL LABORATORY

## 2025-04-14 PROCEDURE — 82746 ASSAY OF FOLIC ACID SERUM: CPT | Mod: ,,, | Performed by: CLINICAL MEDICAL LABORATORY

## 2025-04-14 PROCEDURE — 86803 HEPATITIS C AB TEST: CPT | Mod: ,,, | Performed by: CLINICAL MEDICAL LABORATORY

## 2025-04-14 PROCEDURE — 84439 ASSAY OF FREE THYROXINE: CPT | Mod: ,,, | Performed by: CLINICAL MEDICAL LABORATORY

## 2025-04-14 PROCEDURE — 82607 VITAMIN B-12: CPT | Mod: ,,, | Performed by: CLINICAL MEDICAL LABORATORY

## 2025-04-14 PROCEDURE — 90471 IMMUNIZATION ADMIN: CPT | Mod: ,,,

## 2025-04-14 PROCEDURE — 87389 HIV-1 AG W/HIV-1&-2 AB AG IA: CPT | Mod: ,,, | Performed by: CLINICAL MEDICAL LABORATORY

## 2025-04-14 PROCEDURE — 83036 HEMOGLOBIN GLYCOSYLATED A1C: CPT | Mod: ,,, | Performed by: CLINICAL MEDICAL LABORATORY

## 2025-04-14 PROCEDURE — 80050 GENERAL HEALTH PANEL: CPT | Mod: ,,, | Performed by: CLINICAL MEDICAL LABORATORY

## 2025-04-14 PROCEDURE — 90715 TDAP VACCINE 7 YRS/> IM: CPT | Mod: ,,,

## 2025-04-14 PROCEDURE — 82306 VITAMIN D 25 HYDROXY: CPT | Mod: ,,, | Performed by: CLINICAL MEDICAL LABORATORY

## 2025-04-14 NOTE — PATIENT INSTRUCTIONS
Mammogram  (screening) ordered  Referral to GYN  Labs today   Return to clinic in 1 year and as needed.

## 2025-04-14 NOTE — PROGRESS NOTES
Madison New NP   [address] [place] [address]     PATIENT NAME: Gregoria Zaldivar  : 1990  DATE: 25  MRN: [id number]      Billing Provider: Madison New NP  Level of Service:   Patient PCP Information       None on File            Reason for Visit / Chief Complaint: wellness (Pt presents to the clinic for  Wellness)       Update PCP  Update Chief Complaint         History of Present Illness / Problem Focused Workflow     Gregoria Zaldivar presents to the clinic with wellness (Pt presents to the clinic for  Wellness)     Patient here today for annual wellness visit. She reports no issues today and is not taking medications. Care gaps discussed with patient. Pap smear ordered. Tdap vaccine today. Screening mammogram ordered for family history of breast cancer. A1C, Lipid, Hep C screening, HIV screening today. Return tod clinic in 1 year and as needed.       Review of Systems     Review of Systems   Constitutional: Negative.    HENT: Negative.     Eyes: Negative.    Respiratory:  Negative for cough, shortness of breath and wheezing.    Cardiovascular: Negative.  Negative for chest pain and palpitations.   Gastrointestinal: Negative.  Negative for abdominal pain, nausea and vomiting.   Endocrine: Negative.    Genitourinary: Negative.    Musculoskeletal: Negative.    Integumentary:  Negative.   Allergic/Immunologic: Negative.    Neurological: Negative.  Negative for dizziness and headaches.   Psychiatric/Behavioral: Negative.        Medical / Social / Family History     Past Medical History:   Diagnosis Date    Infertility, female Sep 2023       History reviewed. No pertinent surgical history.    Social History  Ms.  reports that she has never smoked. She has never been exposed to tobacco smoke. She has never used smokeless tobacco. She reports current alcohol use of about 7.0 standard drinks of alcohol per week. She reports that she does not use drugs.    Family  "History  Ms.'s family history includes COPD in her father; Diabetes in her maternal grandmother; Hypertension in her father; Leukemia in her maternal grandfather; Lung cancer in her paternal grandfather.    Medications and Allergies     Medications  Outpatient Medications Marked as Taking for the 4/14/25 encounter (Office Visit) with Madison New NP   Medication Sig Dispense Refill    ibuprofen (ADVIL,MOTRIN) 800 MG tablet Take 1 tablet (800 mg total) by mouth every 8 (eight) hours as needed for Pain. 30 tablet 0    prenatal 25/iron fum/folic/dha (PRENATAL-1 ORAL)        Current Facility-Administered Medications for the 4/14/25 encounter (Office Visit) with Madison New NP   Medication Dose Route Frequency Provider Last Rate Last Admin    [COMPLETED] Tdap (BOOSTRIX) vaccine injection 0.5 mL  0.5 mL Intramuscular 1 time in Clinic/HOD Madison New NP   0.5 mL at 04/14/25 0833       Allergies  Review of patient's allergies indicates:  No Known Allergies    Physical Examination   /79 (BP Location: Right arm, Patient Position: Sitting)   Pulse 78   Temp 97.8 °F (36.6 °C) (Oral)   Resp 20   Ht 5' 8" (1.727 m)   Wt 80.3 kg (177 lb)   LMP 04/03/2025   SpO2 97%   BMI 26.91 kg/m²    Physical Exam  Constitutional:       Appearance: Normal appearance.   HENT:      Right Ear: Tympanic membrane, ear canal and external ear normal.      Left Ear: Tympanic membrane, ear canal and external ear normal.      Nose: Nose normal.      Mouth/Throat:      Pharynx: Oropharynx is clear.   Eyes:      Extraocular Movements: Extraocular movements intact.      Conjunctiva/sclera: Conjunctivae normal.      Pupils: Pupils are equal, round, and reactive to light.   Cardiovascular:      Rate and Rhythm: Normal rate and regular rhythm.      Pulses: Normal pulses.      Heart sounds: Normal heart sounds.   Pulmonary:      Effort: Pulmonary effort is normal. No respiratory distress.      Breath sounds: Normal breath " sounds. No wheezing or rales.   Abdominal:      General: Bowel sounds are normal.      Palpations: Abdomen is soft.      Tenderness: There is no abdominal tenderness. There is no guarding.   Musculoskeletal:         General: Normal range of motion.      Cervical back: Normal range of motion and neck supple.   Lymphadenopathy:      Cervical: No cervical adenopathy.   Skin:     General: Skin is warm and dry.      Capillary Refill: Capillary refill takes less than 2 seconds.   Neurological:      General: No focal deficit present.      Mental Status: She is alert and oriented to person, place, and time.   Psychiatric:         Mood and Affect: Mood normal.         Behavior: Behavior normal.         Thought Content: Thought content normal.         Judgment: Judgment normal.        Assessment and Plan (including Health Maintenance)      Problem List  Smart Sets  Document Outside HM   :    Plan:   Mammogram  (screening) ordered  Referral to GYN  Labs today   Return to clinic in 1 year and as needed.         Health Maintenance Due   Topic Date Due    Hepatitis C Screening  Never done    Cervical Cancer Screening  Never done    HIV Screening  Never done    Influenza Vaccine (1) Never done    COVID-19 Vaccine (1 - 2024-25 season) Never done       Problem List Items Addressed This Visit    None  Visit Diagnoses         Family history of breast cancer    -  Primary    Relevant Medications    Tdap (BOOSTRIX) vaccine injection 0.5 mL (Completed)    Other Relevant Orders    Mammo Digital Screening Bilat w/ Alonzo (XPD)    Hemoglobin A1C    POCT URINALYSIS W/O SCOPE    Lipid Panel    Comprehensive Metabolic Panel    CBC Auto Differential    Hepatitis C Antibody    HIV 1/2 Ag/Ab (4th Gen)    Thyroid Panel    Vitamin D    Vitamin B12 & Folate      Screening for cervical cancer        Relevant Orders    Ambulatory referral/consult to Gynecology            Health Maintenance Topics with due status: Not Due       Topic Last Completion Date     Hemoglobin A1c (Diabetic Prevention Screening) 08/20/2024    TETANUS VACCINE 04/14/2025    RSV Vaccine (Age 60+ and Pregnant patients) Not Due       Future Appointments   Date Time Provider Department Center   [facility]29[facility]2025  [facility]   4/16/2026  8:40 AM Madison New NP [facility]            Signature:  Madison New NP      [address] [place] [address]    Date of encounter: 4/14/25

## 2025-04-28 ENCOUNTER — RESULTS FOLLOW-UP (OUTPATIENT)
Dept: FAMILY MEDICINE | Facility: CLINIC | Age: 35
End: 2025-04-28

## 2025-04-29 ENCOUNTER — HOSPITAL ENCOUNTER (OUTPATIENT)
Dept: RADIOLOGY | Facility: HOSPITAL | Age: 35
Discharge: HOME | End: 2025-04-29
Payer: OTHER GOVERNMENT

## 2025-04-29 DIAGNOSIS — Z80.3 FAMILY HISTORY OF BREAST CANCER: ICD-10-CM

## 2025-04-29 PROCEDURE — 77067 SCR MAMMO BI INCL CAD: CPT | Mod: 26,,, | Performed by: RADIOLOGY

## 2025-04-29 PROCEDURE — 77063 BREAST TOMOSYNTHESIS BI: CPT | Mod: 26,,, | Performed by: RADIOLOGY

## 2025-04-29 PROCEDURE — 77063 BREAST TOMOSYNTHESIS BI: CPT | Mod: TC

## 2025-07-30 ENCOUNTER — PATIENT MESSAGE (OUTPATIENT)
Dept: FAMILY MEDICINE | Facility: CLINIC | Age: 35
End: 2025-07-30
Payer: OTHER GOVERNMENT

## 2025-08-08 ENCOUNTER — OFFICE VISIT (OUTPATIENT)
Dept: FAMILY MEDICINE | Facility: CLINIC | Age: 35
End: 2025-08-08
Payer: OTHER GOVERNMENT

## 2025-08-08 VITALS
TEMPERATURE: 100 F | RESPIRATION RATE: 17 BRPM | BODY MASS INDEX: 26.8 KG/M2 | HEART RATE: 68 BPM | HEIGHT: 68 IN | OXYGEN SATURATION: 100 % | SYSTOLIC BLOOD PRESSURE: 108 MMHG | DIASTOLIC BLOOD PRESSURE: 74 MMHG | WEIGHT: 176.81 LBS

## 2025-08-08 DIAGNOSIS — Z86.19 H/O TRAVELER'S DIARRHEA: ICD-10-CM

## 2025-08-08 DIAGNOSIS — Z78.9 HISTORY OF INTERNATIONAL TRAVEL: ICD-10-CM

## 2025-08-08 DIAGNOSIS — Z71.84 ENCOUNTER FOR HEALTH COUNSELING RELATED TO TRAVEL: Primary | ICD-10-CM

## 2025-08-08 RX ORDER — AZITHROMYCIN 250 MG/1
TABLET, FILM COATED ORAL
Qty: 6 TABLET | Refills: 0 | Status: SHIPPED | OUTPATIENT
Start: 2025-08-08 | End: 2025-08-13

## 2025-08-08 NOTE — PROGRESS NOTES
Subjective:       Patient ID: Gregoria Zaldivar is a 35 y.o. female.    Chief Complaint: Check up before trip (Going on a trip and wants a check up and possible profilactin ABT before leaving.)    Check up before traveling to [place] for her job- states every time she has traveled there she has ended up with traveler's diarrhea so her regular PCP started prescribing her a Zithromax to take daily while there     Review of Systems   Constitutional:  Negative for activity change, appetite change, chills, fatigue, fever and unexpected weight change.   HENT:  Negative for nasal congestion, ear pain, sore throat, trouble swallowing and voice change.    Eyes:  Negative for photophobia, pain, discharge, itching and visual disturbance.   Respiratory:  Negative for cough, chest tightness, shortness of breath and wheezing.    Cardiovascular:  Negative for chest pain, palpitations and leg swelling.   Gastrointestinal:  Negative for abdominal distention, abdominal pain, change in bowel habit, nausea and vomiting.   Endocrine: Negative for cold intolerance, heat intolerance, polydipsia, polyphagia and polyuria.   Genitourinary:  Negative for bladder incontinence, decreased urine volume, flank pain and frequency.   Musculoskeletal:  Negative for arthralgias, back pain, gait problem and neck pain.   Integumentary:  Negative for rash and wound.   Allergic/Immunologic: Negative for immunocompromised state.   Neurological:  Negative for dizziness, vertigo, syncope, weakness, light-headedness, numbness and headaches.   Psychiatric/Behavioral:  Negative for behavioral problems, depressed mood, self-injury, sleep disturbance and suicidal ideas. The patient is not nervous/anxious.    All other systems reviewed and are negative.        Objective:      Physical Exam  Vitals and nursing note reviewed.   Constitutional:       General: She is not in acute distress.     Appearance: Normal appearance. She is not ill-appearing, toxic-appearing or  diaphoretic.   HENT:      Head: Normocephalic.      Mouth/Throat:      Mouth: Mucous membranes are moist.   Eyes:      General: No scleral icterus.        Right eye: No discharge.         Left eye: No discharge.      Extraocular Movements: Extraocular movements intact.      Conjunctiva/sclera: Conjunctivae normal.      Pupils: Pupils are equal, round, and reactive to light.   Cardiovascular:      Rate and Rhythm: Normal rate and regular rhythm.      Pulses: Normal pulses.      Heart sounds: Normal heart sounds. No murmur heard.  Pulmonary:      Effort: Pulmonary effort is normal. No respiratory distress.      Breath sounds: Normal breath sounds. No wheezing, rhonchi or rales.   Musculoskeletal:         General: Normal range of motion.      Cervical back: Normal range of motion and neck supple. No tenderness.   Lymphadenopathy:      Cervical: No cervical adenopathy.   Skin:     General: Skin is warm and dry.      Capillary Refill: Capillary refill takes less than 2 seconds.      Findings: No rash.   Neurological:      Mental Status: She is alert and oriented to person, place, and time.   Psychiatric:         Mood and Affect: Mood normal.         Behavior: Behavior normal.         Thought Content: Thought content normal.         Judgment: Judgment normal.            Assessment:       1. Encounter for health counseling related to travel    2. History of international travel    3. H/O traveler's diarrhea        Plan:   Encounter for health counseling related to travel    History of international travel  -     azithromycin (Z-THI) 250 MG tablet; Take 2 tablets by mouth on day 1; Take 1 tablet by mouth on days 2-5  Dispense: 6 tablet; Refill: 0    H/O traveler's diarrhea           Risks, benefits, and side effects were discussed with the patient. All questions were answered to the fullest satisfaction of the patient, and pt verbalized understanding and agreement to treatment plan. Pt was to call with any new or worsening  symptoms, or present to the ER

## 2025-08-11 PROBLEM — Z78.9 HISTORY OF INTERNATIONAL TRAVEL: Status: RESOLVED | Noted: 2022-12-01 | Resolved: 2025-08-11
